# Patient Record
Sex: FEMALE | Race: AMERICAN INDIAN OR ALASKA NATIVE | NOT HISPANIC OR LATINO | URBAN - METROPOLITAN AREA
[De-identification: names, ages, dates, MRNs, and addresses within clinical notes are randomized per-mention and may not be internally consistent; named-entity substitution may affect disease eponyms.]

---

## 2019-07-13 ENCOUNTER — INPATIENT (INPATIENT)
Facility: HOSPITAL | Age: 29
LOS: 2 days | Discharge: HOME | End: 2019-07-16
Attending: SURGERY | Admitting: SURGERY
Payer: MEDICAID

## 2019-07-13 VITALS
SYSTOLIC BLOOD PRESSURE: 111 MMHG | OXYGEN SATURATION: 99 % | RESPIRATION RATE: 18 BRPM | DIASTOLIC BLOOD PRESSURE: 71 MMHG | HEART RATE: 86 BPM | TEMPERATURE: 99 F

## 2019-07-13 LAB
BASOPHILS # BLD AUTO: 0.05 K/UL — SIGNIFICANT CHANGE UP (ref 0–0.2)
BASOPHILS NFR BLD AUTO: 0.5 % — SIGNIFICANT CHANGE UP (ref 0–1)
EOSINOPHIL # BLD AUTO: 0 K/UL — SIGNIFICANT CHANGE UP (ref 0–0.7)
EOSINOPHIL NFR BLD AUTO: 0 % — SIGNIFICANT CHANGE UP (ref 0–8)
HCT VFR BLD CALC: 39.5 % — SIGNIFICANT CHANGE UP (ref 37–47)
HGB BLD-MCNC: 12.6 G/DL — SIGNIFICANT CHANGE UP (ref 12–16)
IMM GRANULOCYTES NFR BLD AUTO: 0.3 % — SIGNIFICANT CHANGE UP (ref 0.1–0.3)
LYMPHOCYTES # BLD AUTO: 0.69 K/UL — LOW (ref 1.2–3.4)
LYMPHOCYTES # BLD AUTO: 6.9 % — LOW (ref 20.5–51.1)
MCHC RBC-ENTMCNC: 23.1 PG — LOW (ref 27–31)
MCHC RBC-ENTMCNC: 31.9 G/DL — LOW (ref 32–37)
MCV RBC AUTO: 72.3 FL — LOW (ref 81–99)
MONOCYTES # BLD AUTO: 0.21 K/UL — SIGNIFICANT CHANGE UP (ref 0.1–0.6)
MONOCYTES NFR BLD AUTO: 2.1 % — SIGNIFICANT CHANGE UP (ref 1.7–9.3)
NEUTROPHILS # BLD AUTO: 9.02 K/UL — HIGH (ref 1.4–6.5)
NEUTROPHILS NFR BLD AUTO: 90.2 % — HIGH (ref 42.2–75.2)
NRBC # BLD: 0 /100 WBCS — SIGNIFICANT CHANGE UP (ref 0–0)
PLATELET # BLD AUTO: 310 K/UL — SIGNIFICANT CHANGE UP (ref 130–400)
RBC # BLD: 5.46 M/UL — HIGH (ref 4.2–5.4)
RBC # FLD: 16.4 % — HIGH (ref 11.5–14.5)
WBC # BLD: 10 K/UL — SIGNIFICANT CHANGE UP (ref 4.8–10.8)
WBC # FLD AUTO: 10 K/UL — SIGNIFICANT CHANGE UP (ref 4.8–10.8)

## 2019-07-13 PROCEDURE — 99285 EMERGENCY DEPT VISIT HI MDM: CPT

## 2019-07-13 RX ORDER — FAMOTIDINE 10 MG/ML
20 INJECTION INTRAVENOUS ONCE
Refills: 0 | Status: COMPLETED | OUTPATIENT
Start: 2019-07-13 | End: 2019-07-13

## 2019-07-13 RX ORDER — ONDANSETRON 8 MG/1
4 TABLET, FILM COATED ORAL ONCE
Refills: 0 | Status: COMPLETED | OUTPATIENT
Start: 2019-07-13 | End: 2019-07-13

## 2019-07-13 RX ORDER — SODIUM CHLORIDE 9 MG/ML
1000 INJECTION INTRAMUSCULAR; INTRAVENOUS; SUBCUTANEOUS ONCE
Refills: 0 | Status: COMPLETED | OUTPATIENT
Start: 2019-07-13 | End: 2019-07-13

## 2019-07-13 RX ADMIN — ONDANSETRON 4 MILLIGRAM(S): 8 TABLET, FILM COATED ORAL at 23:23

## 2019-07-13 RX ADMIN — FAMOTIDINE 20 MILLIGRAM(S): 10 INJECTION INTRAVENOUS at 23:46

## 2019-07-13 RX ADMIN — SODIUM CHLORIDE 1000 MILLILITER(S): 9 INJECTION INTRAMUSCULAR; INTRAVENOUS; SUBCUTANEOUS at 23:22

## 2019-07-13 NOTE — ED ADULT NURSE NOTE - OBJECTIVE STATEMENT
Patient presents to ED with complaints of abdominal pain, nausea and vomiting x 1 week. AS per patient, pain has only gotten worst. Abdomen soft and  non distended. Denies any constipation. Appetite remains good, no decrease. Denies any fever.

## 2019-07-13 NOTE — ED PROVIDER NOTE - OBJECTIVE STATEMENT
29 yold female to Ed  Lmp:  no signif med hx c/o abdominal pain mid abdomen described as sharp intermittent with n/v 4-5 times today; pain mildly worse with food; pt deneis fever, chills, back pain, urinary sx, diarrhea; pt denies similar pain in past, ovarian issues, or prior hx of inflammatory disease; did not seek medical attention and on prior abdominal workups;

## 2019-07-13 NOTE — ED PROVIDER NOTE - PHYSICAL EXAMINATION
Constitutional: Well developed, well nourished. NAD  Head: Normocephalic, atraumatic.  Eyes: PERRL, EOMI.  ENT: No nasal discharge. Mucous membranes dry.  Neck: Supple. Painless ROM.  Cardiovascular:  Regular rate and rhythm.   Pulmonary: Lungs clear to auscultation bilaterally. No wheezing, rales, or rhonchi.  Abdominal: Soft. Nondistended. No rebound, guarding, rigidity. + mild tenderness mid abdomen; no rebound, guarding or flank pain;  Extremities. Pelvis stable. No lower extremity edema, symmetric calves.  Skin: No rashes, cyanosis.  Neuro: AAOx3. No focal neurological deficits.  Psych: Normal mood. Normal affect.

## 2019-07-13 NOTE — ED PROVIDER NOTE - ATTENDING CONTRIBUTION TO CARE
30 y/o female, non smoker with no relevant PMHx presents to ED for evaluation of abdominal pain, diffuse and intermittent x few hours PTA in ED.  (+) N/V, no diarrhea.  No fever or chills.  No dysuria, frequency or urgency.  LMP 6/17.  No previous surgeries.  PE:  agree with above.  A/P:  Abdominal Pain, Ucg, Labs and Imaging.  NPO.  Will reassess.

## 2019-07-13 NOTE — ED PROVIDER NOTE - PROGRESS NOTE DETAILS
obtained ct results; surgery consulted Dr. Guevara - surgery evaluated pt and after discussion with Dr. Coombs think ct findings incidental; request repeat ct abdomen with po contrast; will continue to follow. Prelim CT scan with ~ 10 cm small bowel to small bowel intussusception.  Pt seen by surgery, they request a PO contrast delayed CT scan.  Endorsed to Dr. Klerman to f/u results and surgery eval.

## 2019-07-14 LAB
ALBUMIN SERPL ELPH-MCNC: 4.4 G/DL — SIGNIFICANT CHANGE UP (ref 3.5–5.2)
ALP SERPL-CCNC: 50 U/L — SIGNIFICANT CHANGE UP (ref 30–115)
ALT FLD-CCNC: 17 U/L — SIGNIFICANT CHANGE UP (ref 0–41)
ANION GAP SERPL CALC-SCNC: 12 MMOL/L — SIGNIFICANT CHANGE UP (ref 7–14)
AST SERPL-CCNC: 20 U/L — SIGNIFICANT CHANGE UP (ref 0–41)
BILIRUB SERPL-MCNC: 0.6 MG/DL — SIGNIFICANT CHANGE UP (ref 0.2–1.2)
BUN SERPL-MCNC: 11 MG/DL — SIGNIFICANT CHANGE UP (ref 10–20)
CALCIUM SERPL-MCNC: 9.6 MG/DL — SIGNIFICANT CHANGE UP (ref 8.5–10.1)
CHLORIDE SERPL-SCNC: 104 MMOL/L — SIGNIFICANT CHANGE UP (ref 98–110)
CO2 SERPL-SCNC: 23 MMOL/L — SIGNIFICANT CHANGE UP (ref 17–32)
CREAT SERPL-MCNC: 0.7 MG/DL — SIGNIFICANT CHANGE UP (ref 0.7–1.5)
GLUCOSE SERPL-MCNC: 114 MG/DL — HIGH (ref 70–99)
LACTATE SERPL-SCNC: 1.8 MMOL/L — SIGNIFICANT CHANGE UP (ref 0.5–2.2)
LIDOCAIN IGE QN: 28 U/L — SIGNIFICANT CHANGE UP (ref 7–60)
POTASSIUM SERPL-MCNC: 4.6 MMOL/L — SIGNIFICANT CHANGE UP (ref 3.5–5)
POTASSIUM SERPL-SCNC: 4.6 MMOL/L — SIGNIFICANT CHANGE UP (ref 3.5–5)
PROT SERPL-MCNC: 7.9 G/DL — SIGNIFICANT CHANGE UP (ref 6–8)
SODIUM SERPL-SCNC: 139 MMOL/L — SIGNIFICANT CHANGE UP (ref 135–146)

## 2019-07-14 PROCEDURE — 74178 CT ABD&PLV WO CNTR FLWD CNTR: CPT | Mod: 26

## 2019-07-14 PROCEDURE — 99285 EMERGENCY DEPT VISIT HI MDM: CPT

## 2019-07-14 RX ORDER — ENOXAPARIN SODIUM 100 MG/ML
40 INJECTION SUBCUTANEOUS DAILY
Refills: 0 | Status: DISCONTINUED | OUTPATIENT
Start: 2019-07-14 | End: 2019-07-15

## 2019-07-14 RX ORDER — PANTOPRAZOLE SODIUM 20 MG/1
40 TABLET, DELAYED RELEASE ORAL DAILY
Refills: 0 | Status: DISCONTINUED | OUTPATIENT
Start: 2019-07-14 | End: 2019-07-15

## 2019-07-14 RX ORDER — ACETAMINOPHEN 500 MG
650 TABLET ORAL EVERY 6 HOURS
Refills: 0 | Status: DISCONTINUED | OUTPATIENT
Start: 2019-07-14 | End: 2019-07-15

## 2019-07-14 RX ORDER — SODIUM CHLORIDE 9 MG/ML
1000 INJECTION, SOLUTION INTRAVENOUS
Refills: 0 | Status: DISCONTINUED | OUTPATIENT
Start: 2019-07-14 | End: 2019-07-15

## 2019-07-14 RX ORDER — CHLORHEXIDINE GLUCONATE 213 G/1000ML
1 SOLUTION TOPICAL
Refills: 0 | Status: DISCONTINUED | OUTPATIENT
Start: 2019-07-14 | End: 2019-07-15

## 2019-07-14 RX ORDER — IOHEXOL 300 MG/ML
30 INJECTION, SOLUTION INTRAVENOUS ONCE
Refills: 0 | Status: COMPLETED | OUTPATIENT
Start: 2019-07-14 | End: 2019-07-14

## 2019-07-14 RX ORDER — OXYCODONE HYDROCHLORIDE 5 MG/1
5 TABLET ORAL EVERY 6 HOURS
Refills: 0 | Status: DISCONTINUED | OUTPATIENT
Start: 2019-07-14 | End: 2019-07-15

## 2019-07-14 RX ADMIN — IOHEXOL 30 MILLILITER(S): 300 INJECTION, SOLUTION INTRAVENOUS at 06:38

## 2019-07-14 RX ADMIN — PANTOPRAZOLE SODIUM 40 MILLIGRAM(S): 20 TABLET, DELAYED RELEASE ORAL at 11:48

## 2019-07-14 RX ADMIN — SODIUM CHLORIDE 100 MILLILITER(S): 9 INJECTION, SOLUTION INTRAVENOUS at 11:48

## 2019-07-14 NOTE — ED ADULT NURSE REASSESSMENT NOTE - NS ED NURSE REASSESS COMMENT FT1
Received pt from VEE Thompson to cover her lunch break. pt is awake, in bed A&Ox3 denies any pain or discomfort at this time. pt aware of the plan of care and has no questions or concerns. Call bill within reach. Safety maintained, Will continue to monitor.

## 2019-07-14 NOTE — H&P ADULT - HISTORY OF PRESENT ILLNESS
29F presents to ED complaining of three weeks of worsening abdominal pain. Came to ED yesterday because the pain was severe and she vomited 10x. The pain is intermittent and in ED had no pain when examined. CT scan showed small bowel-small bowel intussusception. no previous abdominal surgeries, no history of colonoscopy.

## 2019-07-14 NOTE — H&P ADULT - NSHPLABSRESULTS_GEN_ALL_CORE
LAB/STUDIES:                        12.6   10.00 )-----------( 310      ( 13 Jul 2019 23:28 )             39.5     07-13    139  |  104  |  11  ----------------------------<  114<H>  4.6   |  23  |  0.7    Ca    9.6      13 Jul 2019 23:28    TPro  7.9  /  Alb  4.4  /  TBili  0.6  /  DBili  x   /  AST  20  /  ALT  17  /  AlkPhos  50  07-13      LIVER FUNCTIONS - ( 13 Jul 2019 23:28 )  Alb: 4.4 g/dL / Pro: 7.9 g/dL / ALK PHOS: 50 U/L / ALT: 17 U/L / AST: 20 U/L / GGT: x           IMAGING:  CT Abdomen and Pelvis w/ IV Cont:     IMPRESSION:    Approximately 10 cm small bowel-small bowel intussusception extending to just proximal to  the ileocecal valve with small amount of dilated loops proximally and inflammatory changes. No definite lead point is identified, however underlying small bowel tumor/polyp is a possibility.

## 2019-07-14 NOTE — H&P ADULT - ASSESSMENT
29F presents to ED with abdominal pain and vomiting x 10 with ct scan findings of intussusception, benign abdominal exam on evaluation. WBC 10. lactate 1.8.    PLAN:  - Planned for OR tomorrow for diagnostic laparoscopy   -  NPO, IVF, Preop labs (CBC, BMP, Mg, Phos, Coags), Active T&S, EKG and CXR  - GI Prophylaxis w/ Protonix 40mg Daily   - Chemoprophylaxis w/ Heparin SubQ 5000u q8h

## 2019-07-14 NOTE — CONSULT NOTE ADULT - ASSESSMENT
29F presents to ED with abdominal pain and vomiting x 10 with ct scan findings of intussusception  -Repeat CT scan in 1-2 hours, could be incidental finding  -Will follow 29F presents to ED with abdominal pain and vomiting x 10 with ct scan findings of intussusception, benign abdominal exam on evaluation    -Repeat CT scan in 1-2 hours, could be incidental finding  -Will follow

## 2019-07-14 NOTE — CONSULT NOTE ADULT - SUBJECTIVE AND OBJECTIVE BOX
DREW STILL [id number]  29y Female    HPI: 29F presents to ED complaining of three weeks of worsening abdominal pain. Came to ED yesterday because the pain was severe and she vomited 10x. The pain is intermittent and in ED had no pain when examined. CT scan showed intussusception.       PAST MEDICAL & SURGICAL HISTORY:  No pertinent past medical history  No significant past surgical history        MEDICATIONS  (STANDING):    MEDICATIONS  (PRN):      Allergies    No Known Allergies    Intolerances        REVIEW OF SYSTEMS    [ ] A ten-point review of systems was otherwise negative except as noted.  [ ] Due to altered mental status/intubation, subjective information were not able to be obtained from the patient. History was obtained, to the extent possible, from review of the chart and collateral sources of information.      Vital Signs Last 24 Hrs  T(C): 37.3 (14 Jul 2019 00:19), Max: 37.3 (14 Jul 2019 00:19)  T(F): 99.1 (14 Jul 2019 00:19), Max: 99.1 (14 Jul 2019 00:19)  HR: 81 (14 Jul 2019 00:19) (81 - 86)  BP: 100/55 (14 Jul 2019 00:19) (100/55 - 111/71)  BP(mean): --  RR: 18 (14 Jul 2019 00:19) (18 - 18)  SpO2: 99% (14 Jul 2019 00:19) (99% - 99%)    PHYSICAL EXAM:  GENERAL: NAD, well-appearing  CHEST/LUNG: Clear to auscultation bilaterally  HEART: Regular rate and rhythm  ABDOMEN: Soft, Nontender, Nondistended;   EXTREMITIES:  No clubbing, cyanosis, or edema      LABS:  Labs:  CAPILLARY BLOOD GLUCOSE                              12.6   10.00 )-----------( 310      ( 13 Jul 2019 23:28 )             39.5       Auto Neutrophil %: 90.2 % (07-13-19 @ 23:28)  Auto Immature Granulocyte %: 0.3 % (07-13-19 @ 23:28)    07-13    139  |  104  |  11  ----------------------------<  114<H>  4.6   |  23  |  0.7      Calcium, Total Serum: 9.6 mg/dL (07-13-19 @ 23:28)      LFTs:             7.9  | 0.6  | 20       ------------------[50      ( 13 Jul 2019 23:28 )  4.4  | x    | 17          Lipase:28     Amylase:x         Lactate, Blood: 1.8 mmol/L (07-14-19 @ 00:30)      Coags:                    RADIOLOGY & ADDITIONAL STUDIES:  < from: CT Abdomen and Pelvis w/ IV Cont (07.14.19 @ 05:18) >  IMPRESSION:    Approximately 10 cm small bowel-small bowel intussusception extending to   the ileocecal valve with small amount of dilated loops proximally and   inflammatory changes. No obvious lead point is visualized on this exam.    Dr. Onesimo Harden discussed preliminary findings with JOSE ANTONIO MARCELO PA   on 7/14/2019 5:26 AM with readback.    < end of copied text > DREW STILL [id number]  29y Female    HPI: 29F presents to ED complaining of three weeks of worsening abdominal pain. Came to ED yesterday because the pain was severe and she vomited 10x. The pain is intermittent and in ED had no pain when examined. CT scan showed intussusception.       PAST MEDICAL & SURGICAL HISTORY:  No pertinent past medical history  No significant past surgical history        MEDICATIONS  (STANDING):    MEDICATIONS  (PRN):      Allergies    No Known Allergies    Intolerances        REVIEW OF SYSTEMS    [X] A ten-point review of systems was otherwise negative except as noted.  [ ] Due to altered mental status/intubation, subjective information were not able to be obtained from the patient. History was obtained, to the extent possible, from review of the chart and collateral sources of information.      Vital Signs Last 24 Hrs  T(C): 37.3 (14 Jul 2019 00:19), Max: 37.3 (14 Jul 2019 00:19)  T(F): 99.1 (14 Jul 2019 00:19), Max: 99.1 (14 Jul 2019 00:19)  HR: 81 (14 Jul 2019 00:19) (81 - 86)  BP: 100/55 (14 Jul 2019 00:19) (100/55 - 111/71)  BP(mean): --  RR: 18 (14 Jul 2019 00:19) (18 - 18)  SpO2: 99% (14 Jul 2019 00:19) (99% - 99%)    PHYSICAL EXAM:  GENERAL: NAD, well-appearing  CHEST/LUNG: Clear to auscultation bilaterally  HEART: Regular rate and rhythm  ABDOMEN: Soft, Nontender, Nondistended;   EXTREMITIES:  No clubbing, cyanosis, or edema      LABS:  Labs:  CAPILLARY BLOOD GLUCOSE                              12.6   10.00 )-----------( 310      ( 13 Jul 2019 23:28 )             39.5       Auto Neutrophil %: 90.2 % (07-13-19 @ 23:28)  Auto Immature Granulocyte %: 0.3 % (07-13-19 @ 23:28)    07-13    139  |  104  |  11  ----------------------------<  114<H>  4.6   |  23  |  0.7      Calcium, Total Serum: 9.6 mg/dL (07-13-19 @ 23:28)      LFTs:             7.9  | 0.6  | 20       ------------------[50      ( 13 Jul 2019 23:28 )  4.4  | x    | 17          Lipase:28     Amylase:x         Lactate, Blood: 1.8 mmol/L (07-14-19 @ 00:30)        RADIOLOGY & ADDITIONAL STUDIES:  < from: CT Abdomen and Pelvis w/ IV Cont (07.14.19 @ 05:18) >  IMPRESSION:    Approximately 10 cm small bowel-small bowel intussusception extending to   the ileocecal valve with small amount of dilated loops proximally and   inflammatory changes. No obvious lead point is visualized on this exam.    Dr. Onesimo Harden discussed preliminary findings with JOSE ANTONIO MARCELO PA   on 7/14/2019 5:26 AM with readback.    < end of copied text >

## 2019-07-14 NOTE — H&P ADULT - ATTENDING COMMENTS
pt examined  completely asymptomatic now.   was in abdominal pain for 2 weeks.   had vomiting yesterday.  repeat CT done.   contrast in colon.   will continue npo  will continue serial abdominal exam.  examined again - completely asymptomatic.

## 2019-07-14 NOTE — H&P ADULT - NSHPPHYSICALEXAM_GEN_ALL_CORE
PHYSICAL EXAM:  General: NAD, AAOx3, calm and cooperative  Cardiac: RRR S1, S2, no Murmurs, rubs or gallops  Respiratory: CTAB  Abdomen: Soft, non-distended, non-tender, no rebound, no guarding. +BS.

## 2019-07-14 NOTE — CONSULT NOTE ADULT - ATTENDING COMMENTS
pt examined  completely asymptomatic now.   was in abdominal pain for 2 weeks.   had vomiting yesterday.  repeat CT done.   contrast in colon.   will continue npo  will continue serial abdominal exam.

## 2019-07-15 ENCOUNTER — RESULT REVIEW (OUTPATIENT)
Age: 29
End: 2019-07-15

## 2019-07-15 LAB
ANION GAP SERPL CALC-SCNC: 10 MMOL/L — SIGNIFICANT CHANGE UP (ref 7–14)
APTT BLD: 36.8 SEC — SIGNIFICANT CHANGE UP (ref 27–39.2)
BASOPHILS # BLD AUTO: 0.02 K/UL — SIGNIFICANT CHANGE UP (ref 0–0.2)
BASOPHILS NFR BLD AUTO: 0.1 % — SIGNIFICANT CHANGE UP (ref 0–1)
BUN SERPL-MCNC: 8 MG/DL — LOW (ref 10–20)
CALCIUM SERPL-MCNC: 8.4 MG/DL — LOW (ref 8.5–10.1)
CHLORIDE SERPL-SCNC: 107 MMOL/L — SIGNIFICANT CHANGE UP (ref 98–110)
CO2 SERPL-SCNC: 22 MMOL/L — SIGNIFICANT CHANGE UP (ref 17–32)
CREAT SERPL-MCNC: 0.6 MG/DL — LOW (ref 0.7–1.5)
EOSINOPHIL # BLD AUTO: 0 K/UL — SIGNIFICANT CHANGE UP (ref 0–0.7)
EOSINOPHIL NFR BLD AUTO: 0 % — SIGNIFICANT CHANGE UP (ref 0–8)
GLUCOSE SERPL-MCNC: 76 MG/DL — SIGNIFICANT CHANGE UP (ref 70–99)
HCG UR QL: NEGATIVE — SIGNIFICANT CHANGE UP
HCT VFR BLD CALC: 35.2 % — LOW (ref 37–47)
HCT VFR BLD CALC: 35.4 % — LOW (ref 37–47)
HGB BLD-MCNC: 11 G/DL — LOW (ref 12–16)
HGB BLD-MCNC: 11.3 G/DL — LOW (ref 12–16)
IMM GRANULOCYTES NFR BLD AUTO: 0.5 % — HIGH (ref 0.1–0.3)
INR BLD: 1.2 RATIO — SIGNIFICANT CHANGE UP (ref 0.65–1.3)
LYMPHOCYTES # BLD AUTO: 0.79 K/UL — LOW (ref 1.2–3.4)
LYMPHOCYTES # BLD AUTO: 4.3 % — LOW (ref 20.5–51.1)
MAGNESIUM SERPL-MCNC: 2.1 MG/DL — SIGNIFICANT CHANGE UP (ref 1.8–2.4)
MCHC RBC-ENTMCNC: 23.2 PG — LOW (ref 27–31)
MCHC RBC-ENTMCNC: 23.5 PG — LOW (ref 27–31)
MCHC RBC-ENTMCNC: 31.3 G/DL — LOW (ref 32–37)
MCHC RBC-ENTMCNC: 31.9 G/DL — LOW (ref 32–37)
MCV RBC AUTO: 73.6 FL — LOW (ref 81–99)
MCV RBC AUTO: 74.1 FL — LOW (ref 81–99)
MONOCYTES # BLD AUTO: 0.56 K/UL — SIGNIFICANT CHANGE UP (ref 0.1–0.6)
MONOCYTES NFR BLD AUTO: 3 % — SIGNIFICANT CHANGE UP (ref 1.7–9.3)
NEUTROPHILS # BLD AUTO: 17 K/UL — HIGH (ref 1.4–6.5)
NEUTROPHILS NFR BLD AUTO: 92.1 % — HIGH (ref 42.2–75.2)
NRBC # BLD: 0 /100 WBCS — SIGNIFICANT CHANGE UP (ref 0–0)
NRBC # BLD: 0 /100 WBCS — SIGNIFICANT CHANGE UP (ref 0–0)
PHOSPHATE SERPL-MCNC: 2.8 MG/DL — SIGNIFICANT CHANGE UP (ref 2.1–4.9)
PLATELET # BLD AUTO: 255 K/UL — SIGNIFICANT CHANGE UP (ref 130–400)
PLATELET # BLD AUTO: 267 K/UL — SIGNIFICANT CHANGE UP (ref 130–400)
POTASSIUM SERPL-MCNC: 3.7 MMOL/L — SIGNIFICANT CHANGE UP (ref 3.5–5)
POTASSIUM SERPL-SCNC: 3.7 MMOL/L — SIGNIFICANT CHANGE UP (ref 3.5–5)
PROTHROM AB SERPL-ACNC: 13.8 SEC — HIGH (ref 9.95–12.87)
RBC # BLD: 4.75 M/UL — SIGNIFICANT CHANGE UP (ref 4.2–5.4)
RBC # BLD: 4.81 M/UL — SIGNIFICANT CHANGE UP (ref 4.2–5.4)
RBC # FLD: 16.3 % — HIGH (ref 11.5–14.5)
RBC # FLD: 16.3 % — HIGH (ref 11.5–14.5)
SODIUM SERPL-SCNC: 139 MMOL/L — SIGNIFICANT CHANGE UP (ref 135–146)
WBC # BLD: 18.46 K/UL — HIGH (ref 4.8–10.8)
WBC # BLD: 8.53 K/UL — SIGNIFICANT CHANGE UP (ref 4.8–10.8)
WBC # FLD AUTO: 18.46 K/UL — HIGH (ref 4.8–10.8)
WBC # FLD AUTO: 8.53 K/UL — SIGNIFICANT CHANGE UP (ref 4.8–10.8)

## 2019-07-15 PROCEDURE — 88309 TISSUE EXAM BY PATHOLOGIST: CPT | Mod: 26

## 2019-07-15 PROCEDURE — 88342 IMHCHEM/IMCYTCHM 1ST ANTB: CPT | Mod: 26

## 2019-07-15 PROCEDURE — 88341 IMHCHEM/IMCYTCHM EA ADD ANTB: CPT | Mod: 26,59

## 2019-07-15 PROCEDURE — 71045 X-RAY EXAM CHEST 1 VIEW: CPT | Mod: 26

## 2019-07-15 PROCEDURE — 93010 ELECTROCARDIOGRAM REPORT: CPT

## 2019-07-15 PROCEDURE — 44202 LAP ENTERECTOMY: CPT

## 2019-07-15 RX ORDER — MORPHINE SULFATE 50 MG/1
2 CAPSULE, EXTENDED RELEASE ORAL EVERY 6 HOURS
Refills: 0 | Status: DISCONTINUED | OUTPATIENT
Start: 2019-07-15 | End: 2019-07-16

## 2019-07-15 RX ORDER — SODIUM CHLORIDE 9 MG/ML
1000 INJECTION, SOLUTION INTRAVENOUS
Refills: 0 | Status: DISCONTINUED | OUTPATIENT
Start: 2019-07-15 | End: 2019-07-16

## 2019-07-15 RX ORDER — ONDANSETRON 8 MG/1
4 TABLET, FILM COATED ORAL EVERY 4 HOURS
Refills: 0 | Status: DISCONTINUED | OUTPATIENT
Start: 2019-07-15 | End: 2019-07-15

## 2019-07-15 RX ORDER — CEFOTETAN DISODIUM 1 G
1 VIAL (EA) INJECTION EVERY 12 HOURS
Refills: 0 | Status: COMPLETED | OUTPATIENT
Start: 2019-07-15 | End: 2019-07-16

## 2019-07-15 RX ORDER — HEPARIN SODIUM 5000 [USP'U]/ML
5000 INJECTION INTRAVENOUS; SUBCUTANEOUS EVERY 8 HOURS
Refills: 0 | Status: DISCONTINUED | OUTPATIENT
Start: 2019-07-15 | End: 2019-07-16

## 2019-07-15 RX ORDER — ONDANSETRON 8 MG/1
4 TABLET, FILM COATED ORAL EVERY 6 HOURS
Refills: 0 | Status: DISCONTINUED | OUTPATIENT
Start: 2019-07-15 | End: 2019-07-16

## 2019-07-15 RX ORDER — HYDROMORPHONE HYDROCHLORIDE 2 MG/ML
0.25 INJECTION INTRAMUSCULAR; INTRAVENOUS; SUBCUTANEOUS
Refills: 0 | Status: DISCONTINUED | OUTPATIENT
Start: 2019-07-15 | End: 2019-07-15

## 2019-07-15 RX ORDER — HYDROMORPHONE HYDROCHLORIDE 2 MG/ML
0.5 INJECTION INTRAMUSCULAR; INTRAVENOUS; SUBCUTANEOUS
Refills: 0 | Status: DISCONTINUED | OUTPATIENT
Start: 2019-07-15 | End: 2019-07-15

## 2019-07-15 RX ORDER — SODIUM CHLORIDE 9 MG/ML
1000 INJECTION, SOLUTION INTRAVENOUS
Refills: 0 | Status: DISCONTINUED | OUTPATIENT
Start: 2019-07-15 | End: 2019-07-15

## 2019-07-15 RX ORDER — KETOROLAC TROMETHAMINE 30 MG/ML
15 SYRINGE (ML) INJECTION EVERY 6 HOURS
Refills: 0 | Status: DISCONTINUED | OUTPATIENT
Start: 2019-07-15 | End: 2019-07-16

## 2019-07-15 RX ORDER — ACETAMINOPHEN 500 MG
650 TABLET ORAL EVERY 6 HOURS
Refills: 0 | Status: DISCONTINUED | OUTPATIENT
Start: 2019-07-15 | End: 2019-07-16

## 2019-07-15 RX ORDER — OXYCODONE HYDROCHLORIDE 5 MG/1
5 TABLET ORAL EVERY 8 HOURS
Refills: 0 | Status: DISCONTINUED | OUTPATIENT
Start: 2019-07-15 | End: 2019-07-16

## 2019-07-15 RX ORDER — POTASSIUM CHLORIDE 20 MEQ
20 PACKET (EA) ORAL ONCE
Refills: 0 | Status: COMPLETED | OUTPATIENT
Start: 2019-07-15 | End: 2019-07-15

## 2019-07-15 RX ORDER — ACETAMINOPHEN 500 MG
1000 TABLET ORAL ONCE
Refills: 0 | Status: COMPLETED | OUTPATIENT
Start: 2019-07-15 | End: 2019-07-15

## 2019-07-15 RX ADMIN — HEPARIN SODIUM 5000 UNIT(S): 5000 INJECTION INTRAVENOUS; SUBCUTANEOUS at 22:16

## 2019-07-15 RX ADMIN — Medication 400 MILLIGRAM(S): at 17:06

## 2019-07-15 RX ADMIN — Medication 50 MILLIEQUIVALENT(S): at 04:50

## 2019-07-15 RX ADMIN — Medication 100 GRAM(S): at 19:15

## 2019-07-15 RX ADMIN — PANTOPRAZOLE SODIUM 40 MILLIGRAM(S): 20 TABLET, DELAYED RELEASE ORAL at 11:40

## 2019-07-15 RX ADMIN — SODIUM CHLORIDE 100 MILLILITER(S): 9 INJECTION, SOLUTION INTRAVENOUS at 22:19

## 2019-07-15 RX ADMIN — Medication 650 MILLIGRAM(S): at 23:47

## 2019-07-15 NOTE — PROGRESS NOTE ADULT - ASSESSMENT
29F presents to ED with abdominal pain and vomiting x 10 with ct scan findings of intussusception, benign abdominal exam on evaluation    -Repeat CT shows no change in intussusception  -OR planning for today  -NPO, IVF  -Pain control  -GI/DVT ppx

## 2019-07-15 NOTE — BRIEF OPERATIVE NOTE - NSICDXBRIEFPROCEDURE_GEN_ALL_CORE_FT
PROCEDURES:  Block, transverse abdominis plane (TAP) 15-Jul-2019 16:20:04 Bilateral Fede Colbert  Exploratory laparotomy with small bowel resection 15-Jul-2019 16:19:51  Fede Colbert PROCEDURES:  Small bowel resection with anastomosis 16-Jul-2019 07:08:09  Fede Colbert  Block, transverse abdominis plane (TAP) 15-Jul-2019 16:20:04 Bilateral Fede Colbert  Exploratory laparoscopy 15-Jul-2019 16:19:42  Fede Colbert

## 2019-07-15 NOTE — PROGRESS NOTE ADULT - SUBJECTIVE AND OBJECTIVE BOX
GENERAL SURGERY PROGRESS NOTE     DREW STILL  [address] [place] [address] day :1d    Surgical Attending: Keila Hdz    Overnight events: No acute overnight events. Patient is NPO. Abdominal pain controlled with medication.     T(F): 96.6 (07-14-19 @ 23:58), Max: 99.1 (07-14-19 @ 15:52)  HR: 85 (07-14-19 @ 23:58) (80 - 87)  BP: 99/62 (07-14-19 @ 23:58) (99/62 - 114/67)  ABP: --  ABP(mean): --  RR: 18 (07-14-19 @ 23:58) (18 - 18)  SpO2: 100% (07-14-19 @ 23:58) (95% - 100%)      DIET/FLUIDS: lactated ringers. 1000 milliLiter(s) IV Continuous <Continuous>    GI proph:  pantoprazole  Injectable 40 milliGRAM(s) IV Push daily    AC/ proph:   ABx:     PHYSICAL EXAM:  GENERAL: NAD, well-appearing  CHEST/LUNG: Clear to auscultation bilaterally  HEART: Regular rate and rhythm  ABDOMEN: Soft, Nontender, Nondistended;   EXTREMITIES:  No clubbing, cyanosis, or edema      LABS  Labs:  CAPILLARY BLOOD GLUCOSE                              11.3   8.53  )-----------( 267      ( 15 Jul 2019 01:04 )             35.4         07-15    139  |  107  |  8<L>  ----------------------------<  76  3.7   |  22  |  0.6<L>      Calcium, Total Serum: 8.4 mg/dL (07-15-19 @ 01:04)      LFTs:             7.9  | 0.6  | 20       ------------------[50      ( 13 Jul 2019 23:28 )  4.4  | x    | 17          Lipase:28     Amylase:x         Lactate, Blood: 1.8 mmol/L (07-14-19 @ 00:30)      Coags:     13.80  ----< 1.20    ( 15 Jul 2019 01:04 )     36.8          RADIOLOGY & ADDITIONAL TESTS:  < from: CT Abdomen and Pelvis w/ Oral Cont (07.14.19 @ 10:45) >  IMPRESSION:     1. Since July 14, 2018 at 4:59 AM, no significant change in long segment   small bowel-small bowel intussusception with no evidence of obstruction.    < end of copied text >

## 2019-07-15 NOTE — CHART NOTE - NSCHARTNOTEFT_GEN_A_CORE
Post Operative Check    Patient is a 29y old Female s/p exploratory laparotomy with small bowel resection.     Ms. Valdez is alert and oriented. She has passed her trial of void at 9:30pm. She has not passed any gas or had a BM since the surgery. Currently she complains of nausea but she has not vomited. Her pain is controlled.     The patient is vitally stable.     Vitals    T(C): 37.7 (07-15-19 @ 19:45), Max: 37.7 (07-15-19 @ 19:45)  HR: 86 (07-15-19 @ 19:45) (78 - 96)  BP: 110/69 (07-15-19 @ 19:45) (88/52 - 114/67)  RR: 18 (07-15-19 @ 19:45) (14 - 18)  SpO2: 99% (07-15-19 @ 19:37) (97% - 100%)  Wt(kg): --      07-14 @ 07:01  -  07-15 @ 07:00  --------------------------------------------------------  IN:    lactated ringers.: 200 mL  Total IN: 200 mL    OUT:  Total OUT: 0 mL    Total NET: 200 mL          Physical Exam  General: NAD AAOx3   Cards: RRR S1S2  Resp: CTAB  Abdomen:  : no lopez  Ext: NTBL    Labs  Labs:  CAPILLARY BLOOD GLUCOSE                              11.3   8.53  )-----------( 267      ( 15 Jul 2019 01:04 )             35.4         07-15    139  |  107  |  8<L>  ----------------------------<  76  3.7   |  22  |  0.6<L>      Calcium, Total Serum: 8.4 mg/dL (07-15-19 @ 01:04)      LFTs:             7.9  | 0.6  | 20       ------------------[50      ( 13 Jul 2019 23:28 )  4.4  | x    | 17          Lipase:28     Amylase:x         Lactate, Blood: 1.8 mmol/L (07-14-19 @ 00:30)      Coags:     13.80  ----< 1.20    ( 15 Jul 2019 01:04 )     36.8              Radiology:   < from: CT Abdomen and Pelvis w/ Oral Cont (07.14.19 @ 10:45) >    IMPRESSION:     1. Since July 14, 2018 at 4:59 AM, no significant change in long segment   small bowel-small bowel intussusception with no evidence of obstruction.    < end of copied text >              Patient is a 29y old Female s/p exploratory laparotomy with small bowel resection     Plan:  - NS @100   - zofran for nausea   - continue pain management   - advance diet tomorrow

## 2019-07-15 NOTE — BRIEF OPERATIVE NOTE - NSICDXBRIEFPOSTOP_GEN_ALL_CORE_FT
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

## 2019-07-15 NOTE — BRIEF OPERATIVE NOTE - OPERATION/FINDINGS
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

## 2019-07-16 ENCOUNTER — TRANSCRIPTION ENCOUNTER (OUTPATIENT)
Age: 29
End: 2019-07-16

## 2019-07-16 VITALS
RESPIRATION RATE: 18 BRPM | SYSTOLIC BLOOD PRESSURE: 108 MMHG | TEMPERATURE: 100 F | DIASTOLIC BLOOD PRESSURE: 58 MMHG | HEART RATE: 82 BPM

## 2019-07-16 LAB
ALBUMIN SERPL ELPH-MCNC: 3.7 G/DL — SIGNIFICANT CHANGE UP (ref 3.5–5.2)
ALP SERPL-CCNC: 41 U/L — SIGNIFICANT CHANGE UP (ref 30–115)
ALT FLD-CCNC: 12 U/L — SIGNIFICANT CHANGE UP (ref 0–41)
ANION GAP SERPL CALC-SCNC: 15 MMOL/L — HIGH (ref 7–14)
ANION GAP SERPL CALC-SCNC: 8 MMOL/L — SIGNIFICANT CHANGE UP (ref 7–14)
AST SERPL-CCNC: 19 U/L — SIGNIFICANT CHANGE UP (ref 0–41)
BASOPHILS # BLD AUTO: 0.02 K/UL — SIGNIFICANT CHANGE UP (ref 0–0.2)
BASOPHILS NFR BLD AUTO: 0.2 % — SIGNIFICANT CHANGE UP (ref 0–1)
BILIRUB SERPL-MCNC: 0.8 MG/DL — SIGNIFICANT CHANGE UP (ref 0.2–1.2)
BUN SERPL-MCNC: 8 MG/DL — LOW (ref 10–20)
BUN SERPL-MCNC: 9 MG/DL — LOW (ref 10–20)
CALCIUM SERPL-MCNC: 7.4 MG/DL — LOW (ref 8.5–10.1)
CALCIUM SERPL-MCNC: 7.8 MG/DL — LOW (ref 8.5–10.1)
CHLORIDE SERPL-SCNC: 106 MMOL/L — SIGNIFICANT CHANGE UP (ref 98–110)
CHLORIDE SERPL-SCNC: 107 MMOL/L — SIGNIFICANT CHANGE UP (ref 98–110)
CO2 SERPL-SCNC: 15 MMOL/L — LOW (ref 17–32)
CO2 SERPL-SCNC: 22 MMOL/L — SIGNIFICANT CHANGE UP (ref 17–32)
CREAT SERPL-MCNC: 0.6 MG/DL — LOW (ref 0.7–1.5)
CREAT SERPL-MCNC: 0.6 MG/DL — LOW (ref 0.7–1.5)
EOSINOPHIL # BLD AUTO: 0 K/UL — SIGNIFICANT CHANGE UP (ref 0–0.7)
EOSINOPHIL NFR BLD AUTO: 0 % — SIGNIFICANT CHANGE UP (ref 0–8)
GLUCOSE SERPL-MCNC: 115 MG/DL — HIGH (ref 70–99)
GLUCOSE SERPL-MCNC: 93 MG/DL — SIGNIFICANT CHANGE UP (ref 70–99)
HCT VFR BLD CALC: 33.6 % — LOW (ref 37–47)
HGB BLD-MCNC: 10.7 G/DL — LOW (ref 12–16)
IMM GRANULOCYTES NFR BLD AUTO: 0.5 % — HIGH (ref 0.1–0.3)
LYMPHOCYTES # BLD AUTO: 0.98 K/UL — LOW (ref 1.2–3.4)
LYMPHOCYTES # BLD AUTO: 8.8 % — LOW (ref 20.5–51.1)
MAGNESIUM SERPL-MCNC: 1.9 MG/DL — SIGNIFICANT CHANGE UP (ref 1.8–2.4)
MCHC RBC-ENTMCNC: 23.3 PG — LOW (ref 27–31)
MCHC RBC-ENTMCNC: 31.8 G/DL — LOW (ref 32–37)
MCV RBC AUTO: 73 FL — LOW (ref 81–99)
MONOCYTES # BLD AUTO: 0.86 K/UL — HIGH (ref 0.1–0.6)
MONOCYTES NFR BLD AUTO: 7.7 % — SIGNIFICANT CHANGE UP (ref 1.7–9.3)
NEUTROPHILS # BLD AUTO: 9.2 K/UL — HIGH (ref 1.4–6.5)
NEUTROPHILS NFR BLD AUTO: 82.8 % — HIGH (ref 42.2–75.2)
NRBC # BLD: 0 /100 WBCS — SIGNIFICANT CHANGE UP (ref 0–0)
PHOSPHATE SERPL-MCNC: 2.6 MG/DL — SIGNIFICANT CHANGE UP (ref 2.1–4.9)
PLATELET # BLD AUTO: 253 K/UL — SIGNIFICANT CHANGE UP (ref 130–400)
POTASSIUM SERPL-MCNC: 4 MMOL/L — SIGNIFICANT CHANGE UP (ref 3.5–5)
POTASSIUM SERPL-MCNC: 4.2 MMOL/L — SIGNIFICANT CHANGE UP (ref 3.5–5)
POTASSIUM SERPL-SCNC: 4 MMOL/L — SIGNIFICANT CHANGE UP (ref 3.5–5)
POTASSIUM SERPL-SCNC: 4.2 MMOL/L — SIGNIFICANT CHANGE UP (ref 3.5–5)
PROT SERPL-MCNC: 6.1 G/DL — SIGNIFICANT CHANGE UP (ref 6–8)
RBC # BLD: 4.6 M/UL — SIGNIFICANT CHANGE UP (ref 4.2–5.4)
RBC # FLD: 16.3 % — HIGH (ref 11.5–14.5)
SODIUM SERPL-SCNC: 136 MMOL/L — SIGNIFICANT CHANGE UP (ref 135–146)
SODIUM SERPL-SCNC: 137 MMOL/L — SIGNIFICANT CHANGE UP (ref 135–146)
WBC # BLD: 11.11 K/UL — HIGH (ref 4.8–10.8)
WBC # FLD AUTO: 11.11 K/UL — HIGH (ref 4.8–10.8)

## 2019-07-16 RX ADMIN — Medication 650 MILLIGRAM(S): at 06:11

## 2019-07-16 RX ADMIN — Medication 650 MILLIGRAM(S): at 00:53

## 2019-07-16 RX ADMIN — Medication 100 GRAM(S): at 05:14

## 2019-07-16 RX ADMIN — Medication 650 MILLIGRAM(S): at 05:14

## 2019-07-16 RX ADMIN — Medication 650 MILLIGRAM(S): at 11:28

## 2019-07-16 RX ADMIN — HEPARIN SODIUM 5000 UNIT(S): 5000 INJECTION INTRAVENOUS; SUBCUTANEOUS at 14:06

## 2019-07-16 RX ADMIN — HEPARIN SODIUM 5000 UNIT(S): 5000 INJECTION INTRAVENOUS; SUBCUTANEOUS at 05:16

## 2019-07-16 NOTE — PROGRESS NOTE ADULT - ASSESSMENT
A/P:  DREW STILL is a 29yFemale HD/POD___ from Block, transverse abdominis plane (TAP)  Exploratory laparoscopy with small bowel resection  Exploratory laparoscopy    Plan: A/P:  DREW STILL is a 29yFemale POD1 from Exploratory laparoscopy with small bowel resection. Intraoperative findings consisted of a 2 cm small bowel lesion within the intussusception.      Plan:   Pain control  IVF  OOB as tolerated, IF  CLD A/P:  DREW STILL is a 29yFemale POD1 from Exploratory laparoscopy with small bowel resection. Intraoperative findings consisted of a 2 cm small bowel lesion within the intussusception.      Plan:   Pain control  IVF  OOB as tolerated, IS  CLD

## 2019-07-16 NOTE — DISCHARGE NOTE PROVIDER - NSDCCPCAREPLAN_GEN_ALL_CORE_FT
PRINCIPAL DISCHARGE DIAGNOSIS  Diagnosis: Intussusception intestine  Assessment and Plan of Treatment:

## 2019-07-16 NOTE — PROGRESS NOTE ADULT - ATTENDING COMMENTS
pt examined    was in abdominal pain for 2 weeks.   had vomiting yesterday - no vomiting since presentation   repeat CT done.   contrast in colon.     pt continues to have episodic abdominal pain   will continue npo  Will schedule for diagnostic laparoscopy , possible bowel resection
pt examined  was in abdominal pain for 2 weeks.   had vomiting yesterday - no vomiting since presentation . repeat CT done. contrast in colon.     pt continues to have episodic abdominal pain   Pt underwent diagnostic laparoscopy and bowel resection.    today original pain no longer present.   no bm or flatus  pain much better - mostly  incisional  start on diet , advance as tolerated.   leuckocytosis

## 2019-07-16 NOTE — PROGRESS NOTE ADULT - SUBJECTIVE AND OBJECTIVE BOX
GENERAL SURGERY PROGRESS NOTE     DREW STILL  [address] [place] [address] day :2d  POD: 1  Procedure: Block, transverse abdominis plane (TAP)  Exploratory laparotomy with small bowel resection  Exploratory laparoscopy    Surgical Attending: Keila Hdz  Overnight events: Patient went to OR yesterday for Exploratory laparoscopy with small bowel resection after finding an intraoperative intussusception.  She is NPO without nausea or vomiting. She does complain of hiccups this morning. She is voiding and ambulating without difficulty. Denies BM, fevers, chest pain, SOB.    T(F): 98.9 (07-16-19 @ 03:00), Max: 100.1 (07-15-19 @ 23:00)  HR: 92 (07-16-19 @ 03:00) (78 - 96)  BP: 105/57 (07-16-19 @ 03:00) (88/52 - 114/67)  ABP: --  ABP(mean): --  RR: 18 (07-16-19 @ 03:00) (14 - 18)  SpO2: 99% (07-15-19 @ 19:37) (97% - 100%)      07-14-19 @ 07:01  -  07-15-19 @ 07:00  --------------------------------------------------------  IN:    lactated ringers.: 200 mL  Total IN: 200 mL    OUT:  Total OUT: 0 mL    Total NET: 200 mL        DIET/FLUIDS: lactated ringers. 1000 milliLiter(s) IV Continuous <Continuous>    NG:                                                                                DRAINS:     BM:     EMESIS:     URINE:      GI proph:    AC/ proph: heparin  Injectable 5000 Unit(s) SubCutaneous every 8 hours    ABx:     PHYSICAL EXAM:  GENERAL: NAD, well-appearing  CHEST/LUNG: Clear to auscultation bilaterally  HEART: Regular rate and rhythm  ABDOMEN: Soft, Tenderness appropriate post op.  Incisions are clean dry and intact.   EXTREMITIES:  No clubbing, cyanosis, or edema      LABS  Labs:  CAPILLARY BLOOD GLUCOSE                              11.0   18.46 )-----------( 255      ( 15 Jul 2019 22:42 )             35.2       Auto Neutrophil %: 92.1 % (07-15-19 @ 22:42)  Auto Immature Granulocyte %: 0.5 % (07-15-19 @ 22:42)    07-15    136  |  106  |  9<L>  ----------------------------<  93  4.2   |  15<L>  |  0.6<L>      Calcium, Total Serum: 7.4 mg/dL (07-15-19 @ 22:42)      LFTs:     Lactate, Blood: 1.8 mmol/L (07-14-19 @ 00:30)      Coags:     13.80  ----< 1.20    ( 15 Jul 2019 01:04 )     36.8                        RADIOLOGY & ADDITIONAL TESTS: GENERAL SURGERY PROGRESS NOTE     DREW STILL  [address] [place] [address] day :2d  POD: 1  Procedure: Block, transverse abdominis plane (TAP)  small bowel resection  Exploratory laparoscopy    Surgical Attending: Keila Hdz  Overnight events: Patient went to OR yesterday for Exploratory laparoscopy with small bowel resection after finding an intraoperative intussusception.  She is NPO without nausea or vomiting. She does complain of hiccups this morning. She is voiding and ambulating without difficulty. Denies BM, fevers, chest pain, SOB.    T(F): 98.9 (07-16-19 @ 03:00), Max: 100.1 (07-15-19 @ 23:00)  HR: 92 (07-16-19 @ 03:00) (78 - 96)  BP: 105/57 (07-16-19 @ 03:00) (88/52 - 114/67)  ABP: --  ABP(mean): --  RR: 18 (07-16-19 @ 03:00) (14 - 18)  SpO2: 99% (07-15-19 @ 19:37) (97% - 100%)      07-14-19 @ 07:01  -  07-15-19 @ 07:00  --------------------------------------------------------  IN:    lactated ringers.: 200 mL  Total IN: 200 mL    OUT:  Total OUT: 0 mL    Total NET: 200 mL        DIET/FLUIDS: lactated ringers. 1000 milliLiter(s) IV Continuous <Continuous>    NG:                                                                                DRAINS:     BM:     EMESIS:     URINE:      GI proph:    AC/ proph: heparin  Injectable 5000 Unit(s) SubCutaneous every 8 hours    ABx:     PHYSICAL EXAM:  GENERAL: NAD, well-appearing  CHEST/LUNG: Clear to auscultation bilaterally  HEART: Regular rate and rhythm  ABDOMEN: Soft, Tenderness appropriate post op.  Incisions are clean dry and intact.   EXTREMITIES:  No clubbing, cyanosis, or edema      LABS  Labs:  CAPILLARY BLOOD GLUCOSE                              11.0   18.46 )-----------( 255      ( 15 Jul 2019 22:42 )             35.2       Auto Neutrophil %: 92.1 % (07-15-19 @ 22:42)  Auto Immature Granulocyte %: 0.5 % (07-15-19 @ 22:42)    07-15    136  |  106  |  9<L>  ----------------------------<  93  4.2   |  15<L>  |  0.6<L>      Calcium, Total Serum: 7.4 mg/dL (07-15-19 @ 22:42)      LFTs:     Lactate, Blood: 1.8 mmol/L (07-14-19 @ 00:30)      Coags:     13.80  ----< 1.20    ( 15 Jul 2019 01:04 )     36.8                        RADIOLOGY & ADDITIONAL TESTS:

## 2019-07-16 NOTE — DISCHARGE NOTE PROVIDER - NSDCFUADDINST_GEN_ALL_CORE_FT
You are being discharged from [facility]. Please follow up with Dr. Hdz in 1 week. You may remove your dressing 48 hours. Please avoid heavy weight lifting for the next 4-6 weeks. You may shower after 48 hours, keep the dressings dry. If you have any further questions about your care, please do not hesitate to contact Dr. Hdz's clinic.

## 2019-07-16 NOTE — DISCHARGE NOTE PROVIDER - HOSPITAL COURSE
29F presents to ED complaining of three weeks of worsening abdominal pain. She presented to the ED with severe abdominal pain and vomiting x10.  CT  scan showed small bowel-small bowel intussusception. Due to persistent abdominal pain, she was taken to the OR for an exploratory laparoscopy with reduction of intussusception. A small bowel resection was preformed because she was found to have a 2 cm tumor serving as a lead point. She progressed well post-operatively. She was advanced to a FLD without nausea or vomiting. She was ambulating and voiding without difficulty. Her post operative labs showed a WBC of 18.46, and this was repeated later in the day with a WBC of 11.11. She is cleared from a surgery point of view, and will be discharged home.

## 2019-07-16 NOTE — DISCHARGE NOTE NURSING/CASE MANAGEMENT/SOCIAL WORK - NSDCDPATPORTLINK_GEN_ALL_CORE
You can access the [facility] Patient Portal, offered by [facility], by registering with the following website: [email or web address]

## 2019-07-16 NOTE — DISCHARGE NOTE PROVIDER - CARE PROVIDER_API CALL
Keila Hdz)  Surgery  [address] [place] [address], [address] [place] [address]  Phone: [phone]  Fax: [phone]  Follow Up Time: 1 week

## 2019-07-17 PROBLEM — Z78.9 OTHER SPECIFIED HEALTH STATUS: Chronic | Status: ACTIVE | Noted: 2019-07-13

## 2019-07-17 PROBLEM — Z00.00 ENCOUNTER FOR PREVENTIVE HEALTH EXAMINATION: Status: ACTIVE | Noted: 2019-07-17

## 2019-07-18 LAB — SURGICAL PATHOLOGY STUDY: SIGNIFICANT CHANGE UP

## 2019-07-24 ENCOUNTER — APPOINTMENT (OUTPATIENT)
Dept: SURGERY | Facility: CLINIC | Age: 29
End: 2019-07-24
Payer: MEDICAID

## 2019-07-24 VITALS
HEIGHT: 63 IN | SYSTOLIC BLOOD PRESSURE: 102 MMHG | WEIGHT: 111 LBS | BODY MASS INDEX: 19.67 KG/M2 | DIASTOLIC BLOOD PRESSURE: 60 MMHG

## 2019-07-24 DIAGNOSIS — K56.1 INTUSSUSCEPTION: ICD-10-CM

## 2019-07-24 DIAGNOSIS — R19.09 OTHER INTRA-ABDOMINAL AND PELVIC SWELLING, MASS AND LUMP: ICD-10-CM

## 2019-07-24 DIAGNOSIS — R10.9 UNSPECIFIED ABDOMINAL PAIN: ICD-10-CM

## 2019-07-24 DIAGNOSIS — R21 RASH AND OTHER NONSPECIFIC SKIN ERUPTION: ICD-10-CM

## 2019-07-24 PROCEDURE — 99024 POSTOP FOLLOW-UP VISIT: CPT

## 2019-07-24 RX ORDER — CAMPHOR 0.45 %
25 GEL (GRAM) TOPICAL
Qty: 10 | Refills: 0 | Status: ACTIVE | COMMUNITY
Start: 2019-07-24 | End: 1900-01-01

## 2019-07-26 PROBLEM — K56.1 SMALL BOWEL INTUSSUSCEPTION: Status: ACTIVE | Noted: 2019-07-26

## 2019-07-26 NOTE — ASSESSMENT
[FreeTextEntry1] : Vanessa is a 29  year old lady who presented to the ED with abdominal pain. She was diagnosed with small bowel intussusception. She was taken to the Operating room for resection of small bowel.  There was a small bowel mass that was causing the intussusception. \par She has recovered well. She is eating well . She does not have the original months. She has incisional pain.\par  \par \par exam: Normal . Well healing scar. \par \par We explained in great detail the pathophysiology of the disease process. We juan diagrams and discussed the workup for diagnosis and management.\par The Post operative care was explained to the patient. She was counselled on diet , exercise and wound care.\par We discussed the pathology and surgery with her.\par \par We discussed the importance of close follow up. \par We informed that she needs to follow up in 2 weeks for the final pathology. \par We also informed that she can call us if anything changes or has any questions.\par \par

## 2019-07-26 NOTE — HISTORY OF PRESENT ILLNESS
[de-identified] : Vanessa is a 29  year old lady who presented to the ED with abdominal pain. She was diagnosed with small bowel intussusception. She was taken to the Operating room for resection of small bowel.  There was a small bowel mass that was causing the intussusception. \par She has recovered well. She is eating well . She does not have the original months. She has incisional pain.\par  \par

## 2019-07-26 NOTE — PHYSICAL EXAM
[JVD] : no jugular venous distention  [Respiratory Effort] : normal respiratory effort [Alert] : alert [de-identified] : Normal [Calm] : calm [de-identified] : Normal . Well healing scar.  [de-identified] : Normal [de-identified] : Normal

## 2019-08-07 ENCOUNTER — APPOINTMENT (OUTPATIENT)
Dept: SURGERY | Facility: CLINIC | Age: 29
End: 2019-08-07
Payer: MEDICAID

## 2019-08-07 VITALS
DIASTOLIC BLOOD PRESSURE: 64 MMHG | SYSTOLIC BLOOD PRESSURE: 106 MMHG | BODY MASS INDEX: 19.67 KG/M2 | WEIGHT: 111 LBS | HEIGHT: 63 IN

## 2019-08-07 DIAGNOSIS — K64.9 UNSPECIFIED HEMORRHOIDS: ICD-10-CM

## 2019-08-07 PROCEDURE — 99024 POSTOP FOLLOW-UP VISIT: CPT

## 2019-08-08 PROBLEM — K64.9 HEMORRHOIDS: Status: ACTIVE | Noted: 2019-08-08

## 2019-08-08 NOTE — PHYSICAL EXAM
[JVD] : no jugular venous distention  [Respiratory Effort] : normal respiratory effort [Calm] : calm [Alert] : alert [de-identified] : Normal [de-identified] : Normal [de-identified] : hemorrhoid at anterior midline with a fissure in it.  [de-identified] : Normal [de-identified] : Normal . Well healing scar.

## 2019-08-08 NOTE — ASSESSMENT
[FreeTextEntry1] : Vanessa is a 29  year old lady who presented to the ED with abdominal pain. She was diagnosed with small bowel intussusception. She was taken to the Operating room for resection of small bowel.  There was a small bowel mass that was causing the intussusception. \par She has recovered well. She is eating well . She does not have the original months. She has incisional pain.\par She continues to improve. \par She complains of some "gasses".\par She also complains of some "dark stool".\par She also complains of blood when she wipes, she has minimal pain when she has a bowel movement. \par  \par exam: Normal . Well healing scar.  - hemorrhoid at anterior midline with a fissure in it. \par \par Her Rashes have healed. \par "Gasses" were explained.\par Dark stool is likely from vitamins she is taking. So asked her to see if stopping it would change. \par The blood on wiping is likely from hemorrhoid+ fissure. \par \par We disscussed about high fiber diet / water intake and stool softneres. \par \par We explained in great detail the pathophysiology of the disease process. We juan diagrams and discussed the workup for diagnosis and management.\par The Post operative care was explained to the patient. She was counselled on diet , exercise and wound care.\par We discussed the pathology and surgery with her.\par \par We discussed the importance of close follow up. \par We informed that she needs to follow up in 2 - 3 weeks with me and Dr. Lopez\par We also informed that she can call us if anything changes or has any questions.\par \par

## 2019-08-08 NOTE — HISTORY OF PRESENT ILLNESS
[de-identified] : Vanessa is a 29  year old lady who presented to the ED with abdominal pain. She was diagnosed with small bowel intussusception. She was taken to the Operating room for resection of small bowel.  There was a small bowel mass that was causing the intussusception. \par She has recovered well. She is eating well . She does not have the original months. She has incisional pain.\par She continues to improve. \par She complains of some "gasses".\par She also complains of some "dark stool".\par She also complains of blood when she wipes, she has minimal pain when she has a bowel movement. \par  \par

## 2019-08-21 ENCOUNTER — APPOINTMENT (OUTPATIENT)
Dept: SURGERY | Facility: CLINIC | Age: 29
End: 2019-08-21
Payer: MEDICAID

## 2019-08-21 VITALS
SYSTOLIC BLOOD PRESSURE: 92 MMHG | HEIGHT: 63 IN | DIASTOLIC BLOOD PRESSURE: 64 MMHG | BODY MASS INDEX: 19.84 KG/M2 | WEIGHT: 112 LBS

## 2019-08-21 PROCEDURE — 99024 POSTOP FOLLOW-UP VISIT: CPT

## 2019-08-28 NOTE — HISTORY OF PRESENT ILLNESS
[de-identified] : Vanessa is a 29  year old lady who presented to the ED with abdominal pain. She was diagnosed with small bowel intussusception. She was taken to the Operating room for resection of small bowel.  There was a small bowel mass that was causing the intussusception. \par She has recovered well. She is eating well . She does not have the original months. She has incisional pain.\par She continues to improve.   She complains of some "gasses". She also complains of some "dark stool".\par She also complains of blood when she wipes, she has minimal pain when she has a bowel movement. \par She has improved completely . She still has her fissure symptoms.

## 2019-08-28 NOTE — ASSESSMENT
[FreeTextEntry1] : Vanessa is a 29  year old lady who presented to the ED with abdominal pain. She was diagnosed with small bowel intussusception. She was taken to the Operating room for resection of small bowel.  There was a small bowel mass that was causing the intussusception. \par She has recovered well. She is eating well . She does not have the original months. She has incisional pain.\par She continues to improve.   She complains of some "gasses". She also complains of some "dark stool".\par She also complains of blood when she wipes, she has minimal pain when she has a bowel movement. \par She has improved completely . She still has her fissure symptoms. \par  \par exam: Normal . Well healing scar.  - hemorrhoid at anterior midline with a fissure in it. \par \par Dark stool is likely from vitamins she is taking. So asked her to see if stopping it would change. \par The blood on wiping is likely from hemorrhoid+ fissure. \par \par We discussed about high fiber diet / water intake and stool softeners. \par \par We explained in great detail the pathophysiology of the disease process. We juan diagrams and discussed the workup for diagnosis and management.\par The Post operative care was explained to the patient. She was counselled on diet , exercise and wound care.\par We discussed the pathology and surgery with her.\par The Post operative care was explained to the patient. She was counselled on diet , exercise and wound care.\par We discussed the pathology and surgery with her.\par We dicussed the pathology was not definitive for a diagnosis but has almost no malignant characteristics. \par \par We discussed the importance of close follow up. \par We informed that she needs to follow up with Dr. Lopez\par We also informed that she can call us if anything changes or has any questions.\par \par

## 2019-08-28 NOTE — PHYSICAL EXAM
[JVD] : no jugular venous distention  [Respiratory Effort] : normal respiratory effort [Alert] : alert [Calm] : calm [de-identified] : Normal [de-identified] : Normal [de-identified] : Normal . Well healing scar.  [de-identified] : hemorrhoid at anterior midline with a fissure in it.  [de-identified] : Normal

## 2019-09-03 ENCOUNTER — APPOINTMENT (OUTPATIENT)
Dept: SURGERY | Facility: CLINIC | Age: 29
End: 2019-09-03
Payer: MEDICAID

## 2019-09-03 VITALS
HEIGHT: 63 IN | BODY MASS INDEX: 19.49 KG/M2 | SYSTOLIC BLOOD PRESSURE: 112 MMHG | WEIGHT: 110 LBS | DIASTOLIC BLOOD PRESSURE: 66 MMHG

## 2019-09-03 PROCEDURE — 46600 DIAGNOSTIC ANOSCOPY SPX: CPT

## 2019-09-03 PROCEDURE — 99203 OFFICE O/P NEW LOW 30 MIN: CPT | Mod: 25

## 2019-09-03 RX ORDER — HYDROCORTISONE 25 MG/G
2.5 CREAM TOPICAL 3 TIMES DAILY
Qty: 2 | Refills: 1 | Status: ACTIVE | COMMUNITY
Start: 2019-09-03 | End: 1900-01-01

## 2019-09-13 NOTE — HISTORY OF PRESENT ILLNESS
[FreeTextEntry1] : This is a new patient visit for Ms. STILL who has complaints of hemorrhoids. Ms. STILL states that she has some bleeding and pain particularly with defecation. She does have some constipation which makes matters worse. She states that she has used and Preparation H but with limited success.

## 2019-09-13 NOTE — PHYSICAL EXAM
[Posterior] : posteriorly [Excoriation] : excoriations [Multiple Sinus Tracts] : no perianal sinus tracts [Fistula] : no fistulas [Wart] : no warts [Ulcer ___ cm] : no ulcers [Pilonidal Cyst] : no pilonidal cysts [Pilonidal Sinus] : no pilonidal sinus [Pilonidal Sinus Draining] : no pilonidal sinus drainage [Nonprolapsing] : a nonprolapsing (grade I) [Skin Tags] : residual hemorrhoidal skin tags were noted [Normal] : was normal [None] : there was no rectal mass  [Alert] : alert [Oriented to Person] : oriented to person [Oriented to Place] : oriented to place [Oriented to Time] : oriented to time [Calm] : calm [de-identified] : Healthy female, NAD [de-identified] : Full range of motion [de-identified] : No focal deficits.

## 2019-09-13 NOTE — ASSESSMENT
[FreeTextEntry1] : Ms. STILL appears to have a small posterior anal fissure. They'll start her off with the hydrocortisone cream to use 3 times a day. I've advised her dietary modification including pressures fresh vegetables hold ratios are present as well as a stool softener and increase fiber in diet and and and treat more water. She'll return to see me in 3 weeks certainly sooner should any problems arise.

## 2019-09-13 NOTE — PROCEDURE
[FreeTextEntry1] : Examination of the perineum reveals a small anterior skin tag with extension into the anal canal but minimal erythema induration and no bleeding the perianal skin appears within normal limits.\par \par SEEMA, good tone no palpable mass.\par \par Anoscopy, obvious posterior anal fissure as well as an anterior skin tag, no significant hemorrhoids.

## 2019-09-24 ENCOUNTER — APPOINTMENT (OUTPATIENT)
Dept: SURGERY | Facility: CLINIC | Age: 29
End: 2019-09-24
Payer: MEDICAID

## 2019-09-24 VITALS
WEIGHT: 114 LBS | BODY MASS INDEX: 20.2 KG/M2 | DIASTOLIC BLOOD PRESSURE: 70 MMHG | HEIGHT: 63 IN | SYSTOLIC BLOOD PRESSURE: 104 MMHG

## 2019-09-24 PROCEDURE — 99214 OFFICE O/P EST MOD 30 MIN: CPT

## 2019-09-30 NOTE — ASSESSMENT
[FreeTextEntry1] : Ms. STILL is doing well with the current therapy. She is advised to continue stool softeners and fiber diet. She can use the agger cortisone only as needed in the future. She states that the sentinel pile does cause her some mild discomfort and is bothersome. She is interested in having it removed. The risks benefits and alternatives were all discussed. She understands that it does not have to be removed this in turn to cancer or any other problems associated with that B. will not go away on its own and will likely get larger in the future. The surgery was discussed with her in detail and all questions were answered. Informed consent was obtained. Ms. STILL will let us know if she would like to proceed with surgery and call to schedule.

## 2019-09-30 NOTE — HISTORY OF PRESENT ILLNESS
[FreeTextEntry1] : Return patient visit for Ms. STILL who is being treated for an anal fissure. At last visit Ms. STILL was placed on a hydrocortisone based cream. She returns today and states that she feels much better and has no no further painful defecation. She denies any bleeding.

## 2019-09-30 NOTE — PHYSICAL EXAM
[FreeTextEntry1] : Looks and feels well per\par \par On her but per\par \par Examination of the perineum reveals a healing anal fissure with a fairly prominent the anterior sentinel pile. It is minimal erythema mild induration.

## 2019-10-15 ENCOUNTER — APPOINTMENT (OUTPATIENT)
Dept: SURGERY | Facility: CLINIC | Age: 29
End: 2019-10-15
Payer: MEDICAID

## 2019-10-15 VITALS
HEIGHT: 63 IN | SYSTOLIC BLOOD PRESSURE: 116 MMHG | WEIGHT: 117 LBS | BODY MASS INDEX: 20.73 KG/M2 | DIASTOLIC BLOOD PRESSURE: 68 MMHG

## 2019-10-15 PROCEDURE — 99214 OFFICE O/P EST MOD 30 MIN: CPT

## 2019-10-17 NOTE — HISTORY OF PRESENT ILLNESS
[FreeTextEntry1] : This is a return patient visit for Ms. STILL who is being treated for an anal fissure. At last visit Ms. STILL was placed on a hydrocortisone based cream. She returns today and states that she continues to have painful defecation and mild bleeding.  She is scheduled for surgery and would like to proceed.

## 2019-10-17 NOTE — ASSESSMENT
[FreeTextEntry1] : Ms. STILL is advised to continue stool softeners and fiber diet. She can use the  cortisone only as needed in the future. She states that the sentinel pile does cause her some mild discomfort and is bothersome. She is interested in having it removed. The risks benefits and alternatives were all discussed. She understands that it does not have to be removed this in turn to cancer or any other problems associated with that B. will not go away on its own and will likely get larger in the future. The surgery was discussed with her in detail and all questions were answered. Informed consent was obtained. Ms. STILL  would like to proceed with the scheduled surgery.

## 2019-10-17 NOTE — PHYSICAL EXAM
[FreeTextEntry1] : Looks and feels well.\par \par vitals as noted above\par \par Examination of the perineum reveals a healing anal fissure with a fairly prominent the anterior sentinel pile. It is mild erythema and induration.

## 2019-10-25 ENCOUNTER — RESULT REVIEW (OUTPATIENT)
Age: 29
End: 2019-10-25

## 2019-10-25 ENCOUNTER — APPOINTMENT (OUTPATIENT)
Dept: SURGERY | Facility: HOSPITAL | Age: 29
End: 2019-10-25

## 2019-10-25 ENCOUNTER — OUTPATIENT (OUTPATIENT)
Dept: OUTPATIENT SERVICES | Facility: HOSPITAL | Age: 29
LOS: 1 days | Discharge: HOME | End: 2019-10-25
Payer: MEDICAID

## 2019-10-25 VITALS
DIASTOLIC BLOOD PRESSURE: 62 MMHG | RESPIRATION RATE: 21 BRPM | OXYGEN SATURATION: 100 % | HEART RATE: 82 BPM | SYSTOLIC BLOOD PRESSURE: 102 MMHG

## 2019-10-25 VITALS
RESPIRATION RATE: 16 BRPM | DIASTOLIC BLOOD PRESSURE: 65 MMHG | OXYGEN SATURATION: 100 % | TEMPERATURE: 98 F | HEART RATE: 80 BPM | SYSTOLIC BLOOD PRESSURE: 107 MMHG | WEIGHT: 104.94 LBS

## 2019-10-25 PROCEDURE — 46220 EXCISE ANAL EXT TAG/PAPILLA: CPT

## 2019-10-25 PROCEDURE — 88304 TISSUE EXAM BY PATHOLOGIST: CPT | Mod: 26

## 2019-10-25 RX ORDER — DOCUSATE SODIUM 100 MG
1 CAPSULE ORAL
Qty: 9 | Refills: 0
Start: 2019-10-25 | End: 2019-10-27

## 2019-10-25 RX ORDER — SODIUM CHLORIDE 9 MG/ML
1000 INJECTION, SOLUTION INTRAVENOUS
Refills: 0 | Status: DISCONTINUED | OUTPATIENT
Start: 2019-10-25 | End: 2019-11-17

## 2019-10-25 RX ORDER — MORPHINE SULFATE 50 MG/1
2 CAPSULE, EXTENDED RELEASE ORAL
Refills: 0 | Status: DISCONTINUED | OUTPATIENT
Start: 2019-10-25 | End: 2019-10-25

## 2019-10-25 RX ORDER — BUPIVACAINE 13.3 MG/ML
20 INJECTION, SUSPENSION, LIPOSOMAL INFILTRATION ONCE
Refills: 0 | Status: DISCONTINUED | OUTPATIENT
Start: 2019-10-25 | End: 2019-11-17

## 2019-10-25 RX ORDER — ONDANSETRON 8 MG/1
4 TABLET, FILM COATED ORAL ONCE
Refills: 0 | Status: DISCONTINUED | OUTPATIENT
Start: 2019-10-25 | End: 2019-11-17

## 2019-10-25 RX ORDER — OXYCODONE AND ACETAMINOPHEN 5; 325 MG/1; MG/1
1 TABLET ORAL EVERY 4 HOURS
Refills: 0 | Status: DISCONTINUED | OUTPATIENT
Start: 2019-10-25 | End: 2019-10-25

## 2019-10-25 RX ADMIN — SODIUM CHLORIDE 100 MILLILITER(S): 9 INJECTION, SOLUTION INTRAVENOUS at 13:18

## 2019-10-25 NOTE — BRIEF OPERATIVE NOTE - OPERATION/FINDINGS
Anal fissure, sentinel pile. Exparel injected around site, packed with foam gel, covered with gauze, abdominal pads, and silk tape.

## 2019-10-25 NOTE — H&P ADULT - HISTORY OF PRESENT ILLNESS
29Y female with history of hemorrhoids and anal fissure, presenting for elective exam under anesthesia and excision of sentinel pile

## 2019-10-25 NOTE — ASU DISCHARGE PLAN (ADULT/PEDIATRIC) - ASU DC SPECIAL INSTRUCTIONSFT
You are being discharged from [facility]. Please schedule a follow up appointment with Dr. Lopez in clinic as previously discussed in 3 weeks. You have been prescribed Percocet for pain relief and colace for constipation, please take as directed. Please avoid heavy or spicy foods. You may remove your dressing when you have the urge to have a bowel movement. If you have any further questions about your care, please do not hesitate to contact Dr. Lopez's office at the number provided.

## 2019-10-25 NOTE — ASU DISCHARGE PLAN (ADULT/PEDIATRIC) - CARE PROVIDER_API CALL
Benny Lopez)  ColonRectal Surgery  [address] [place] [address], [address] [place] [address]  Phone: [phone]  Fax: [phone]  Follow Up Time:

## 2019-10-25 NOTE — H&P ADULT - ASSESSMENT
29Y female with history of hemorrhoids and anal fissure, presenting for elective exam under anesthesia and excision of sentinel pile     Plan:   -OR today for elective exam under anesthesia and excision of sentinel pile   -Discharge home from PACU once stable

## 2019-10-25 NOTE — BRIEF OPERATIVE NOTE - NSICDXBRIEFPROCEDURE_GEN_ALL_CORE_FT
PROCEDURES:  Exam under anesthesia, rectum, with fissurectomy 25-Oct-2019 13:14:03 Exam under anesthesia, excision of sentinel pile, fissurectomy Blossom Shukla

## 2019-10-25 NOTE — CHART NOTE - NSCHARTNOTEFT_GEN_A_CORE
PACU ANESTHESIA PACU ADMISSION NOTE      Procedure:Exam under anesthesia, rectum, with fissurectomy: Exam under anesthesia, excision of sentinel pile, fissurectomy    Post op diagnosisAnal fissure      ____ Intubated  TV:______       Rate: ______      FiO2: ______    ___x_ Patent Airway    ____x Full return of protective reflexes    ____ Full recovery from anesthesia / sedation to baseline status    Viitals:  see anesthesia record            Mental Status:  __x__ Awake   _____ Alert   _____ Drowsy   _____ Sedated    Nausea/Vomiting: ____ Yes, See Post - Op Orders      ____x No    Pain Scale (0-10): _____    Treatment: ____ None    ___x_ See Post - Op/PCA Orders    Post - Operative Fluids:   ____ Oral   __x__ See Post - Op Orders    Plan:         Discharge:   x____Home       _____Floor         _____Critical Care    _____Other:_________________    Comments: uneventful perioperative course; no s/s anesthesia complications noted; D/C home when criteria met

## 2019-10-25 NOTE — H&P ADULT - NSHPPHYSICALEXAM_GEN_ALL_CORE
PHYSICAL EXAM:  GENERAL: NAD, well-appearing  CHEST/LUNG: Clear to auscultation bilaterally  HEART: Regular rate and rhythm  ABDOMEN: Soft, Nontender, Nondistended;   EXTREMITIES:  No clubbing, cyanosis, or edema

## 2019-10-30 LAB — SURGICAL PATHOLOGY STUDY: SIGNIFICANT CHANGE UP

## 2019-10-31 DIAGNOSIS — K64.4 RESIDUAL HEMORRHOIDAL SKIN TAGS: ICD-10-CM

## 2019-10-31 DIAGNOSIS — K60.2 ANAL FISSURE, UNSPECIFIED: ICD-10-CM

## 2019-11-12 ENCOUNTER — TRANSCRIPTION ENCOUNTER (OUTPATIENT)
Age: 29
End: 2019-11-12

## 2019-11-13 ENCOUNTER — APPOINTMENT (OUTPATIENT)
Dept: SURGERY | Facility: CLINIC | Age: 29
End: 2019-11-13
Payer: MEDICAID

## 2019-11-13 VITALS
WEIGHT: 116 LBS | DIASTOLIC BLOOD PRESSURE: 66 MMHG | BODY MASS INDEX: 20.55 KG/M2 | TEMPERATURE: 98.7 F | SYSTOLIC BLOOD PRESSURE: 116 MMHG | HEART RATE: 84 BPM | HEIGHT: 63 IN

## 2019-11-13 PROCEDURE — 99024 POSTOP FOLLOW-UP VISIT: CPT

## 2019-11-13 NOTE — HISTORY OF PRESENT ILLNESS
[FreeTextEntry1] : Patient is a 29F who presents for follow up.  Patient has a history of posterior midline anal fissure that was not responsive to conservative management.  On 10/25 she was taken to the OR for fissurectomy and skin tag excision. Pathology showed tissue consistent with fissure and sentinel pile. She states that she has been gradually improving however 2 days ago she noticed increased pain and drainage with a BM.  She is concerned it has become infected. She denies other symptoms including fevers, chills, nausea, vomiting, abdominal pain, constipation, diarrhea, blood in his stool or unexpected weight loss. She has one soft BM daily.  Patient denies a family history of colon cancer rectal cancer or inflammatory bowel disease.  Patient has never had a colonoscopy.\par

## 2019-11-13 NOTE — ASSESSMENT
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 I recommended she continue with nifedipine cream as prescribed by Dr. Lopez.  She can use tylenol or ibuprofen for pain and perform sitz baths to help reduce inflammation.  She will follow up with Dr. Lopez as scheduled.

## 2019-11-13 NOTE — PHYSICAL EXAM
[Abdomen Masses] : No abdominal masses [Abdomen Tenderness] : ~T No ~M abdominal tenderness [No HSM] : no hepatosplenomegaly [Posterior] : posteriorly [Excoriation] : no perianal excoriation [Fistula] : no fistulas [Wart] : no warts [Tender, Swollen] : nontender, non-swollen [Ulcer ___ cm] : no ulcers [Respiratory Effort] : normal respiratory effort [Skin Tags] : there were no residual hemorrhoidal skin tags seen [Normal Rate and Rhythm] : normal rate and rhythm [de-identified] : partial dehiscence of anterior suture line with loose suture material present.  Suture material removed. [de-identified] : Anal fissure with granulation tissue present and new tissue at the edges. Both wounds without erythema induration or purulence to indicate infection. [de-identified] : SEEMA and anoscopy not performed due to pain [de-identified] : awake, alert and in no acute distress

## 2019-11-21 ENCOUNTER — APPOINTMENT (OUTPATIENT)
Dept: SURGERY | Facility: CLINIC | Age: 29
End: 2019-11-21
Payer: MEDICAID

## 2019-11-21 VITALS
HEIGHT: 63 IN | SYSTOLIC BLOOD PRESSURE: 80 MMHG | WEIGHT: 115 LBS | DIASTOLIC BLOOD PRESSURE: 60 MMHG | TEMPERATURE: 98.5 F | BODY MASS INDEX: 20.38 KG/M2 | HEART RATE: 95 BPM

## 2019-11-21 DIAGNOSIS — K60.2 ANAL FISSURE, UNSPECIFIED: ICD-10-CM

## 2019-11-21 PROCEDURE — 99212 OFFICE O/P EST SF 10 MIN: CPT

## 2019-11-22 PROBLEM — K60.2 ANAL FISSURE: Status: ACTIVE | Noted: 2019-08-08

## 2019-12-11 NOTE — HISTORY OF PRESENT ILLNESS
[FreeTextEntry1] : This is a followup visit for Ms. STILL who is status post excision of large skin tag and fissure. Ms. STILL looks and feels well. She has no complaints. She is tolerating a diet without difficulty and moving her bowels of difficulty. She has minimal pain with defecation and no bleeding.

## 2019-12-11 NOTE — PHYSICAL EXAM
[FreeTextEntry1] : Looks and feels well.\par \par I was above.\par \par Examination of the upper site reveals it to be healing well there is minor granulation tissue at the suture site there is no bleeding or drainage

## 2019-12-11 NOTE — ASSESSMENT
[FreeTextEntry1] : Ms. STILL is recovering nicely from her surgery. She has no complaints. She'll followup on a p.r.n. basis

## 2020-02-06 ENCOUNTER — TRANSCRIPTION ENCOUNTER (OUTPATIENT)
Age: 30
End: 2020-02-06

## 2020-02-06 ENCOUNTER — APPOINTMENT (OUTPATIENT)
Dept: SURGERY | Facility: CLINIC | Age: 30
End: 2020-02-06

## 2022-02-14 NOTE — ED ADULT TRIAGE NOTE - PAIN: PRESENCE, MLM
· Possibly bradycardia related  · NMS obtained in March 2020 normal  · Toprol resumed at decreased dose of 12 5 mg daily as opposed to b i d  · Cardiology input appreciated  · Outpatient follow-up    No further inpatient workup recommended complains of pain/discomfort

## 2023-04-13 NOTE — ASU PREOP CHECKLIST - SITE MARKED BY SURGEON
n/a Odomzo Counseling- I discussed with the patient the risks of Odomzo including but not limited to nausea, vomiting, diarrhea, constipation, weight loss, changes in the sense of taste, decreased appetite, muscle spasms, and hair loss.  The patient verbalized understanding of the proper use and possible adverse effects of Odomzo.  All of the patient's questions and concerns were addressed.